# Patient Record
Sex: MALE | Race: OTHER | HISPANIC OR LATINO | URBAN - METROPOLITAN AREA
[De-identification: names, ages, dates, MRNs, and addresses within clinical notes are randomized per-mention and may not be internally consistent; named-entity substitution may affect disease eponyms.]

---

## 2022-11-16 ENCOUNTER — EMERGENCY (EMERGENCY)
Facility: HOSPITAL | Age: 22
LOS: 1 days | Discharge: HOME | End: 2022-11-16
Attending: EMERGENCY MEDICINE | Admitting: EMERGENCY MEDICINE

## 2022-11-16 VITALS
HEART RATE: 80 BPM | TEMPERATURE: 97 F | RESPIRATION RATE: 18 BRPM | HEIGHT: 67 IN | OXYGEN SATURATION: 96 % | SYSTOLIC BLOOD PRESSURE: 100 MMHG | WEIGHT: 119.93 LBS | DIASTOLIC BLOOD PRESSURE: 66 MMHG

## 2022-11-16 DIAGNOSIS — S09.90XA UNSPECIFIED INJURY OF HEAD, INITIAL ENCOUNTER: ICD-10-CM
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
## 2022-11-16 DIAGNOSIS — R51.9 HEADACHE, UNSPECIFIED: ICD-10-CM

## 2022-11-16 PROCEDURE — 99284 EMERGENCY DEPT VISIT MOD MDM: CPT

## 2022-11-16 PROCEDURE — 70450 CT HEAD/BRAIN W/O DYE: CPT | Mod: 26

## 2022-11-16 NOTE — ED PROVIDER NOTE - PROGRESS NOTE DETAILS
ANTWON: Patient is resting comfortably, NAD. Declined pain medicine. Signed out to Dr. Montenegro pending result of [facility]. ANTWON: Patient is resting comfortably, NAD. Declined pain medicine. Signed out to Dr. Mac pending result of [facility]. Final CT head report just came and is negative.  There was a prolonged wait for CT due to extra high-volume of radiology cases in our system.  Will discharge with head injury precautions.
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
## 2022-11-16 NOTE — ED PROVIDER NOTE - NSFOLLOWUPINSTRUCTIONS_ED_ALL_ED_FT
Post-Concussion Syndrome    Post-concussion syndrome is when symptoms last longer than normal after a head injury.  What are the signs or symptoms?  After a head injury, you may:  Have headaches. Feel tired. Feel dizzy. Feel weak. Have trouble seeing. Have trouble in bright lights .Have trouble hearing. Not be able to remember things. Not be able to focus. Have trouble sleeping. Have mood swings. Have trouble learning new things. These can last from weeks to months.    Follow these instructions at home:  OTC Motrin/Advil (ibuprofen) 600mg every 6h and/or Tylenol (acetaminophen) 1000mg every 6h for pain.       Take all medicines only as told by your doctor.  Do not take prescription pain medicines.    Activity     Limit activities as told by your doctor. This includes:  Homework. Job-related work. Thinking. Watching TV. Using a computer or phone. Puzzles Exercise. Sports.  Slowly return to your normal activity as told by your doctor. Stop an activity if you have symptoms. Do not do anything that may cause you to get injured again.    General instructions     Rest. Try to:  Sleep 7–9 hours each night. Take naps or breaks when you feel tired during the day. Do not drink alcohol until your doctor says that you can. Keep track of your symptoms. Keep all follow-up visits as told by your doctor. This is important.     Contact a doctor if:  You do not improve. You get worse. You have another injury.   Get help right away if:  You have a very bad headache. You feel confused. You feel very sleepy. You pass out (faint). You throw up (vomit).You feel weak in any part of your body. You feel numb in any part of your body. You start shaking (have a seizure).You have trouble talking.     Summary  Post-concussion syndrome is when symptoms last longer than normal after a head injury. Limit all activity after your injury. Gradually return to normal activity as told by your doctor. Rest, do not drink alcohol, and avoid prescription pain medicines after a concussion. Call your doctor if your symptoms get worse.    This information is not intended to replace advice given to you by your health care provider. Make sure you discuss any questions you have with your health care provider.
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
## 2022-11-16 NOTE — ED PROVIDER NOTE - PATIENT PORTAL LINK FT
You can access the FollowMyHealth Patient Portal offered by [facility] by registering at the following website: [email or web address]. By joining [facility]’s FollowMyHealth portal, you will also be able to view your health information using other applications (apps) compatible with our system.

## 2022-11-16 NOTE — ED ADULT NURSE NOTE - NS ED NOTE ABUSE SUSPICION NEGLECT YN
Patient called back with allergies to food and preservatives:  Nitrates (canned foods)  Sulfates  MSG  Sweeteners (only uses Stevia)  Processed foods  Mixed spices  Bagged lettuce  Soy oil/ Hydrogenated Soy  \"the stuff that makes meat taste good\" - preservative    Allergy list updated.  Patient states that after first dose of Hydralazine she had reaction of throat tickling and starting to swell.   Stopped taking and no further symptoms.     Dr. RODRÍGUEZ,  Any further recommendations regarding BP management considering allergies?      No
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
